# Patient Record
Sex: FEMALE | Race: WHITE | NOT HISPANIC OR LATINO | Employment: FULL TIME | URBAN - METROPOLITAN AREA
[De-identification: names, ages, dates, MRNs, and addresses within clinical notes are randomized per-mention and may not be internally consistent; named-entity substitution may affect disease eponyms.]

---

## 2024-09-05 ENCOUNTER — OFFICE VISIT (OUTPATIENT)
Age: 34
End: 2024-09-05
Payer: COMMERCIAL

## 2024-09-05 ENCOUNTER — LAB (OUTPATIENT)
Age: 34
End: 2024-09-05
Payer: COMMERCIAL

## 2024-09-05 VITALS
BODY MASS INDEX: 46.01 KG/M2 | WEIGHT: 250 LBS | SYSTOLIC BLOOD PRESSURE: 116 MMHG | HEART RATE: 68 BPM | OXYGEN SATURATION: 99 % | DIASTOLIC BLOOD PRESSURE: 68 MMHG | HEIGHT: 62 IN

## 2024-09-05 DIAGNOSIS — F99 INSOMNIA DUE TO OTHER MENTAL DISORDER: ICD-10-CM

## 2024-09-05 DIAGNOSIS — F41.0 GENERALIZED ANXIETY DISORDER WITH PANIC ATTACKS: ICD-10-CM

## 2024-09-05 DIAGNOSIS — F33.1 MODERATE EPISODE OF RECURRENT MAJOR DEPRESSIVE DISORDER: ICD-10-CM

## 2024-09-05 DIAGNOSIS — F51.05 INSOMNIA DUE TO OTHER MENTAL DISORDER: ICD-10-CM

## 2024-09-05 DIAGNOSIS — R63.0 ANOREXIA: ICD-10-CM

## 2024-09-05 DIAGNOSIS — R73.9 HYPERGLYCEMIA: ICD-10-CM

## 2024-09-05 DIAGNOSIS — E55.9 VITAMIN D DEFICIENCY DISEASE: Primary | ICD-10-CM

## 2024-09-05 DIAGNOSIS — F41.1 GENERALIZED ANXIETY DISORDER WITH PANIC ATTACKS: ICD-10-CM

## 2024-09-05 DIAGNOSIS — R94.31 ABNORMAL EKG: ICD-10-CM

## 2024-09-05 DIAGNOSIS — R63.0 ANOREXIA: Primary | ICD-10-CM

## 2024-09-05 DIAGNOSIS — F17.200 NICOTINE DEPENDENCE DUE TO VAPING NON-TOBACCO PRODUCT: ICD-10-CM

## 2024-09-05 PROBLEM — R12 HEART BURN: Status: ACTIVE | Noted: 2020-08-03

## 2024-09-05 PROBLEM — K92.0 HEMATEMESIS: Status: ACTIVE | Noted: 2019-11-04

## 2024-09-05 PROBLEM — F31.30 BIPOLAR DISORDER CURRENT EPISODE DEPRESSED: Status: ACTIVE | Noted: 2019-09-05

## 2024-09-05 PROBLEM — F50.22 BULIMIA NERVOSA, MODERATE: Status: ACTIVE | Noted: 2019-02-01

## 2024-09-05 PROBLEM — F50.2 BULIMIA NERVOSA, MODERATE: Status: ACTIVE | Noted: 2019-02-01

## 2024-09-05 PROBLEM — F12.20 CANNABIS DEPENDENCE: Status: ACTIVE | Noted: 2020-08-28

## 2024-09-05 PROBLEM — R13.10 DYSPHAGIA: Status: ACTIVE | Noted: 2019-09-05

## 2024-09-05 LAB
25(OH)D3 SERPL-MCNC: 39.7 NG/ML (ref 30–100)
ALBUMIN SERPL-MCNC: 4.2 G/DL (ref 3.5–5.2)
ALBUMIN/GLOB SERPL: 1.2 G/DL
ALP SERPL-CCNC: 108 U/L (ref 39–117)
ALT SERPL W P-5'-P-CCNC: 23 U/L (ref 1–33)
AMYLASE SERPL-CCNC: 46 U/L (ref 28–100)
ANION GAP SERPL CALCULATED.3IONS-SCNC: 9.8 MMOL/L (ref 5–15)
AST SERPL-CCNC: 22 U/L (ref 1–32)
BASOPHILS # BLD AUTO: 0.04 10*3/MM3 (ref 0–0.2)
BASOPHILS NFR BLD AUTO: 0.4 % (ref 0–1.5)
BILIRUB SERPL-MCNC: 0.4 MG/DL (ref 0–1.2)
BUN SERPL-MCNC: 13 MG/DL (ref 6–20)
BUN/CREAT SERPL: 14.1 (ref 7–25)
CALCIUM SPEC-SCNC: 9.8 MG/DL (ref 8.6–10.5)
CHLORIDE SERPL-SCNC: 100 MMOL/L (ref 98–107)
CHOLEST SERPL-MCNC: 278 MG/DL (ref 0–200)
CO2 SERPL-SCNC: 26.2 MMOL/L (ref 22–29)
CREAT SERPL-MCNC: 0.92 MG/DL (ref 0.57–1)
DEPRECATED RDW RBC AUTO: 40.6 FL (ref 37–54)
EGFRCR SERPLBLD CKD-EPI 2021: 85 ML/MIN/1.73
EOSINOPHIL # BLD AUTO: 0.12 10*3/MM3 (ref 0–0.4)
EOSINOPHIL NFR BLD AUTO: 1.3 % (ref 0.3–6.2)
ERYTHROCYTE [DISTWIDTH] IN BLOOD BY AUTOMATED COUNT: 13.1 % (ref 12.3–15.4)
FERRITIN SERPL-MCNC: 68.8 NG/ML (ref 13–150)
GLOBULIN UR ELPH-MCNC: 3.4 GM/DL
GLUCOSE SERPL-MCNC: 108 MG/DL (ref 65–99)
HBA1C MFR BLD: 5.6 % (ref 4.8–5.6)
HCT VFR BLD AUTO: 40.4 % (ref 34–46.6)
HDLC SERPL-MCNC: 33 MG/DL (ref 40–60)
HGB BLD-MCNC: 13.5 G/DL (ref 12–15.9)
IMM GRANULOCYTES # BLD AUTO: 0.03 10*3/MM3 (ref 0–0.05)
IMM GRANULOCYTES NFR BLD AUTO: 0.3 % (ref 0–0.5)
IRON 24H UR-MRATE: 103 MCG/DL (ref 37–145)
IRON SATN MFR SERPL: 30 % (ref 20–50)
LDLC SERPL CALC-MCNC: 189 MG/DL (ref 0–100)
LDLC/HDLC SERPL: 5.7 {RATIO}
LIPASE SERPL-CCNC: 27 U/L (ref 13–60)
LYMPHOCYTES # BLD AUTO: 2.47 10*3/MM3 (ref 0.7–3.1)
LYMPHOCYTES NFR BLD AUTO: 25.9 % (ref 19.6–45.3)
MAGNESIUM SERPL-MCNC: 2.2 MG/DL (ref 1.6–2.6)
MCH RBC QN AUTO: 28.9 PG (ref 26.6–33)
MCHC RBC AUTO-ENTMCNC: 33.4 G/DL (ref 31.5–35.7)
MCV RBC AUTO: 86.5 FL (ref 79–97)
MONOCYTES # BLD AUTO: 0.52 10*3/MM3 (ref 0.1–0.9)
MONOCYTES NFR BLD AUTO: 5.5 % (ref 5–12)
NEUTROPHILS NFR BLD AUTO: 6.35 10*3/MM3 (ref 1.7–7)
NEUTROPHILS NFR BLD AUTO: 66.6 % (ref 42.7–76)
NRBC BLD AUTO-RTO: 0 /100 WBC (ref 0–0.2)
PHOSPHATE SERPL-MCNC: 2.5 MG/DL (ref 2.5–4.5)
PLATELET # BLD AUTO: 356 10*3/MM3 (ref 140–450)
PMV BLD AUTO: 9.1 FL (ref 6–12)
POTASSIUM SERPL-SCNC: 4.5 MMOL/L (ref 3.5–5.2)
PROT SERPL-MCNC: 7.6 G/DL (ref 6–8.5)
RBC # BLD AUTO: 4.67 10*6/MM3 (ref 3.77–5.28)
SODIUM SERPL-SCNC: 136 MMOL/L (ref 136–145)
T4 FREE SERPL-MCNC: 1.09 NG/DL (ref 0.92–1.68)
TIBC SERPL-MCNC: 347 MCG/DL (ref 298–536)
TRANSFERRIN SERPL-MCNC: 233 MG/DL (ref 200–360)
TRIGL SERPL-MCNC: 284 MG/DL (ref 0–150)
TSH SERPL DL<=0.05 MIU/L-ACNC: 0.96 UIU/ML (ref 0.27–4.2)
VIT B12 BLD-MCNC: 570 PG/ML (ref 211–946)
VLDLC SERPL-MCNC: 56 MG/DL (ref 5–40)
WBC NRBC COR # BLD AUTO: 9.53 10*3/MM3 (ref 3.4–10.8)

## 2024-09-05 PROCEDURE — 84439 ASSAY OF FREE THYROXINE: CPT | Performed by: INTERNAL MEDICINE

## 2024-09-05 PROCEDURE — 83735 ASSAY OF MAGNESIUM: CPT | Performed by: INTERNAL MEDICINE

## 2024-09-05 PROCEDURE — 82306 VITAMIN D 25 HYDROXY: CPT | Performed by: INTERNAL MEDICINE

## 2024-09-05 PROCEDURE — 82728 ASSAY OF FERRITIN: CPT | Performed by: INTERNAL MEDICINE

## 2024-09-05 PROCEDURE — 83540 ASSAY OF IRON: CPT | Performed by: INTERNAL MEDICINE

## 2024-09-05 PROCEDURE — 83690 ASSAY OF LIPASE: CPT | Performed by: INTERNAL MEDICINE

## 2024-09-05 PROCEDURE — 84100 ASSAY OF PHOSPHORUS: CPT | Performed by: INTERNAL MEDICINE

## 2024-09-05 PROCEDURE — 80050 GENERAL HEALTH PANEL: CPT | Performed by: INTERNAL MEDICINE

## 2024-09-05 PROCEDURE — 82150 ASSAY OF AMYLASE: CPT | Performed by: INTERNAL MEDICINE

## 2024-09-05 PROCEDURE — 82607 VITAMIN B-12: CPT | Performed by: INTERNAL MEDICINE

## 2024-09-05 PROCEDURE — 80061 LIPID PANEL: CPT | Performed by: INTERNAL MEDICINE

## 2024-09-05 PROCEDURE — 36415 COLL VENOUS BLD VENIPUNCTURE: CPT | Performed by: INTERNAL MEDICINE

## 2024-09-05 PROCEDURE — 83036 HEMOGLOBIN GLYCOSYLATED A1C: CPT | Performed by: INTERNAL MEDICINE

## 2024-09-05 PROCEDURE — 99204 OFFICE O/P NEW MOD 45 MIN: CPT | Performed by: INTERNAL MEDICINE

## 2024-09-05 PROCEDURE — 84466 ASSAY OF TRANSFERRIN: CPT | Performed by: INTERNAL MEDICINE

## 2024-09-05 RX ORDER — HYDROXYZINE PAMOATE 25 MG/1
25 CAPSULE ORAL 3 TIMES DAILY PRN
COMMUNITY
Start: 2024-07-18

## 2024-09-05 RX ORDER — PROPRANOLOL HCL 60 MG
60 CAPSULE, EXTENDED RELEASE 24HR ORAL DAILY
COMMUNITY
Start: 2024-07-17

## 2024-09-05 RX ORDER — FLUOXETINE 40 MG/1
80 CAPSULE ORAL DAILY
COMMUNITY
Start: 2024-07-17 | End: 2024-10-29

## 2024-09-05 RX ORDER — ONDANSETRON 4 MG/1
4 TABLET, ORALLY DISINTEGRATING ORAL EVERY 8 HOURS PRN
COMMUNITY
Start: 2022-06-29 | End: 2024-09-06 | Stop reason: SDUPTHER

## 2024-09-05 RX ORDER — CLONIDINE HYDROCHLORIDE 0.2 MG/1
0.2 TABLET ORAL
COMMUNITY
Start: 2022-07-29

## 2024-09-05 NOTE — PROGRESS NOTES
New Patient Note    Adriana Bauman is a 34 y.o. female.    Chief Complaint   Patient presents with    [facility]    Eating Disorder       HPI    Anorexia  - went to [facility] last year, there for 8 months  - Marni since 2020 [facility], weekly  - Lucero Larson for counseling [id number], weekly  - no purging issues  - now having more restriction issues  - Nutrition - meal plan  - Lucero working on EMDR and Eating DO  - no leg cramps or palpitations or presyncope/syncope  - have had electrolyte issues with Mg and Phos, K  - Lunch - no  - Super - no  - Breakfast - energy drink  - yesterday had PB crackers, Banana  - supplements: 2 protein shakes per day  - exercise: 4-5 times weekly, gym or weights a home    Previously seen by Dionne Holly at , [facility]    Insomnia  - mainly for sleep - Clonidine and pared with Vistaril    Depression  - had at one time dx with BAD  - off Lamotrigine for 1yr, took around 5yrs, no differnce  - Fluoxetine has done well    Anxiety  - Propranolol used daily        Past Medical History:  Patient Active Problem List   Diagnosis    Bipolar disorder current episode depressed    Bulimia nervosa, moderate    Cannabis dependence    Dysphagia    Generalized anxiety disorder with panic attacks    Heart burn    Hematemesis    Insomnia due to other mental disorder        Medications:    Current Outpatient Medications:     cloNIDine (CATAPRES) 0.2 MG tablet, Take 1 tablet by mouth., Disp: , Rfl:     FLUoxetine (PROzac) 40 MG capsule, Take 2 capsules by mouth Daily., Disp: , Rfl:     hydrOXYzine pamoate (VISTARIL) 25 MG capsule, Take 1 capsule by mouth 3 (Three) Times a Day As Needed. 50 mg at night, Disp: , Rfl:     ondansetron ODT (ZOFRAN-ODT) 4 MG disintegrating tablet, Place 1 tablet on the tongue Every 8 (Eight) Hours As Needed for Nausea., Disp: 15 tablet, Rfl: 1    propranolol LA (INDERAL LA) 60 MG 24 hr capsule, Take 1 capsule by mouth  Daily., Disp: , Rfl:      Allergy to Medications:  Allergies   Allergen Reactions    Hpv 4-Valent Vaccine Recombinant Vaccine Rash, Swelling and Unknown (See Comments)        Immunizations:  Immunization History   Administered Date(s) Administered    COVID-19 (MODERNA) 1st,2nd,3rd Dose Monovalent 2021, 2021    COVID-19 (MODERNA) Monovalent Original Booster 2021    COVID-19 (PFIZER) BIVALENT 12+YRS 10/17/2022    Fluzone (or Fluarix & Flulaval for VFC) >6mos 10/05/2021, 10/17/2022, 2023    Hepatitis B Adult/Adolescent IM 2003, 2003, 2004    Influenza, Unspecified 2016, 10/23/2018, 10/23/2019    MMR 1993, 1997    Tdap 2017       GYN History:  LMP:last month  Menstrual Concerns: sometimes irregular  History of STD: chlamydia long time ago  Last Pap:not sure, probably due for one  or   History of Abnormal Pap:no  Pregnancy History: G:  P:no    Surgeries:  Past Surgical History:   Procedure Laterality Date    CHOLECYSTECTOMY  2018        Family History:  Family History   Problem Relation Age of Onset    Alcohol abuse Mother     Drug abuse Mother          if fentanyl overdose in     Alcohol abuse Father     Drug abuse Father          of opiate overdose in     Heart disease Maternal Grandmother     Heart attack Maternal Grandmother     Alcohol abuse Maternal Grandfather     Alcohol abuse Paternal Grandfather     Colon cancer Neg Hx     Breast cancer Neg Hx     Stroke Neg Hx         Social:  Social History     Socioeconomic History    Marital status: Single   Tobacco Use    Smoking status: Former     Current packs/day: 0.00     Types: Cigarettes     Quit date: 2018     Years since quittin.6    Smokeless tobacco: Never   Vaping Use    Vaping status: Every Day    Substances: Nicotine    Devices: Refillable tank   Substance and Sexual Activity    Alcohol use: Not Currently    Drug use: Not Currently     Types: Marijuana    Sexual  "activity: Not Currently     Birth control/protection: Abstinence       Household:  Occupation:[employer]  Hobbies:  Exercise:    No longer using THC      Objective   /68   Pulse 68   Ht 157.5 cm (62\")   Wt 113 kg (250 lb)   SpO2 99%   BMI 45.73 kg/m²   Class 3 Severe Obesity (BMI >=40). Obesity-related health conditions include the following: none. Obesity is unchanged. BMI is is above average; BMI management plan is completed. We discussed portion control, increasing exercise, and Information on healthy weight added to patient's after visit summary.       Physical Exam  Vitals reviewed.   Constitutional:       General: She is not in acute distress.  HENT:      Nose: Nose normal.      Mouth/Throat:      Mouth: Mucous membranes are moist.   Eyes:      Conjunctiva/sclera: Conjunctivae normal.   Neck:      Comments: No thyroid enlargement or nodularity  Cardiovascular:      Rate and Rhythm: Normal rate and regular rhythm.      Heart sounds: Normal heart sounds. No murmur heard.  Pulmonary:      Effort: Pulmonary effort is normal. No respiratory distress.      Breath sounds: Normal breath sounds.   Abdominal:      General: Abdomen is flat. Bowel sounds are normal. There is no distension.      Palpations: Abdomen is soft.      Tenderness: There is no abdominal tenderness.   Skin:     General: Skin is warm and dry.   Neurological:      Mental Status: She is alert.   Psychiatric:         Mood and Affect: Mood normal.         Thought Content: Thought content normal.         Judgment: Judgment normal.       ECG 12 Lead    Date/Time: 9/7/2024 8:47 PM  Performed by: Alexandra Irwin MD    Authorized by: Alexandra Irwin MD  Comparison: compared with previous ECG   Comparison to previous ECG: [facility] 2013: TWI V1-V2  [facility] 2022: TWI V1-V3, TWF V4  Rhythm: sinus rhythm  Rate: normal  Conduction: conduction normal  ST Segments: ST segments normal  T inversion: V1, V2, V3 and V4  QRS axis: Leftward " axis.    Clinical impression: abnormal EKG  Comments: In review of [facility] EKGs, 2013 TWI V1-V2, 2022: TWI V1-3, today TWI V1-V4. May represent persistent juvenile pattern but will plan to refer to Cards for further assessment.         Assessment & Plan   1. Anorexia  - follows with Marni Hickman, weekly  - follows with Lucero Larson for counseling, weekly  - no purging  - mostly restriction issues  - no active palps, presyncope/syncope, leg cramps  - exercises 4-5 times weekly  - 2 protein shakes per day  - CBC & Differential; Future  - Ferritin; Future  - Amylase; Future  - Lipase; Future  - Comprehensive Metabolic Panel; Future  - Vitamin D,25-Hydroxy; Future  - TSH+Free T4; Future  - Phosphorus; Future  - Magnesium; Future  - Vitamin B12; Future  - Iron Profile; Future  - Lipid Panel; Future    Abnormal EKG  - In review of [facility] EKGs, 2013 TWI V1-V2, 2022: TWI V1-3, today TWI V1-V4. May represent persistent juvenile pattern but will plan to refer to Cards for further assessment.   - no palps, CP, presyncope or syncope  - may represent persistent juvenile pattern but will refer to Cards for additional assessment in lieu of eating do history and progression of ant leads with TWI as older.     Insomnia  - continue Clonidine and Vistaril    Depression  - had at one time dx with BAD  - off Lamotrigine for 1yr, took around 5yrs, no differnce  - Fluoxetine has done well, continue  - following with Lucero Larson weekly    Anxiety  - Propranolol used daily, continue    Hyperglycemia  - Hemoglobin A1c; Future     Nicotine Dependence due to Vaping  Merna Howe  reports that she quit smoking about 6 years ago. Her smoking use included cigarettes. She has never used smokeless tobacco. I have educated her on the risk of diseases from using tobacco products such as cancer, COPD, and heart disease.     I advised her to quit and she is willing to quit in the future. We will explore further as other mental health is  improved.     FU in 1mo for annual    Health Care Maintenance:  HIV Screen:  Hep C Screen:    Colon Ca Screening: Start Screening at 44yo   Mammogram: Start Screening at 41yo   Pap: 2021, due 2024  Lung Cancer: Positive smoking history screen at 49yo    Immunizations:discuss next visit    Lipids:today  A1C:today    Alexandra Irwin MD, FAAP, FACP  Internal Medicine and Pediatrics  [facility]

## 2024-09-06 RX ORDER — ONDANSETRON 4 MG/1
4 TABLET, ORALLY DISINTEGRATING ORAL EVERY 8 HOURS PRN
Qty: 15 TABLET | Refills: 1 | Status: SHIPPED | OUTPATIENT
Start: 2024-09-06

## 2024-09-07 PROBLEM — F31.30 BIPOLAR DISORDER CURRENT EPISODE DEPRESSED: Status: RESOLVED | Noted: 2019-09-05 | Resolved: 2024-09-07

## 2024-09-07 PROBLEM — F33.1 MODERATE EPISODE OF RECURRENT MAJOR DEPRESSIVE DISORDER: Status: ACTIVE | Noted: 2024-09-07

## 2024-09-07 PROBLEM — R13.10 DYSPHAGIA: Status: RESOLVED | Noted: 2019-09-05 | Resolved: 2024-09-07

## 2024-09-07 PROBLEM — K92.0 HEMATEMESIS: Status: RESOLVED | Noted: 2019-11-04 | Resolved: 2024-09-07

## 2024-09-07 PROBLEM — F17.200 NICOTINE DEPENDENCE DUE TO VAPING NON-TOBACCO PRODUCT: Status: ACTIVE | Noted: 2024-09-07

## 2024-09-07 PROBLEM — R94.31 ABNORMAL EKG: Status: ACTIVE | Noted: 2024-09-07

## 2024-09-07 PROBLEM — R63.0 ANOREXIA: Status: ACTIVE | Noted: 2024-09-07

## 2024-09-07 PROCEDURE — 93000 ELECTROCARDIOGRAM COMPLETE: CPT | Performed by: INTERNAL MEDICINE

## 2024-10-04 ENCOUNTER — TELEPHONE (OUTPATIENT)
Age: 34
End: 2024-10-04
Payer: COMMERCIAL

## 2024-10-18 ENCOUNTER — OFFICE VISIT (OUTPATIENT)
Age: 34
End: 2024-10-18
Payer: COMMERCIAL

## 2024-10-18 VITALS
DIASTOLIC BLOOD PRESSURE: 76 MMHG | HEART RATE: 76 BPM | OXYGEN SATURATION: 98 % | HEIGHT: 62 IN | SYSTOLIC BLOOD PRESSURE: 122 MMHG | RESPIRATION RATE: 18 BRPM | BODY MASS INDEX: 45.73 KG/M2

## 2024-10-18 DIAGNOSIS — Z23 NEED FOR VACCINATION: ICD-10-CM

## 2024-10-18 DIAGNOSIS — Z00.00 WELLNESS EXAMINATION: Primary | ICD-10-CM

## 2024-10-18 PROCEDURE — 99395 PREV VISIT EST AGE 18-39: CPT | Performed by: INTERNAL MEDICINE

## 2024-10-18 PROCEDURE — 90471 IMMUNIZATION ADMIN: CPT | Performed by: INTERNAL MEDICINE

## 2024-10-18 PROCEDURE — 90656 IIV3 VACC NO PRSV 0.5 ML IM: CPT | Performed by: INTERNAL MEDICINE

## 2024-10-18 RX ORDER — CLONIDINE HYDROCHLORIDE 0.2 MG/1
0.2 TABLET ORAL
COMMUNITY
Start: 2024-09-11

## 2024-10-18 RX ORDER — HYDROXYZINE PAMOATE 50 MG/1
50 CAPSULE ORAL 3 TIMES DAILY PRN
COMMUNITY
Start: 2024-09-11

## 2024-10-18 NOTE — PROGRESS NOTES
Annual Health Maintenance Exam    Subjective      Merna Carley     RIMA     Ms. Howe is here today for their annual visit.     General Health:  Reported Health: Good    Social History:  Household Members: Roomate  Marital Status: Unmarried   Housing Situation: Stable  /  Unstable  Work Status: Employed Full Time   Occupation:[employer]  Hobbies/ Travel:    General Health:  Diet: consistency work with Marni right now  Caffeine:2-3 energy drinks, 1-2 cups of coffee  Calcium Intake:lactose intolerant  Weight Concerns:eating do  Supplements:no  Exercise:Marni has asked her to refrain bc have had history of over doing it before  Screen Time:2-3hrs, mostly reading on [employer]    Dental Exam:several years  Dental Concerns:no, does have dental insurance    Vision Exam:last year  Vision Concerns:wears contacts/glasses    Hearing Loss:no    Risky Behaviors:  Seatbelt Use:yes  Texting While Driving:no    Tobacco Use:vaping, counseled on use, but focused on working on eating do  If positive:     pack years    Alcohol Use:no  If positive, consider AUDITC    Drug Use:no  If positive, consider DAST    Reproductive Health:  LMP:just ended yesterday  Last Pap: Date:2022    Cytology / HPV Result:normal  History of Abnormal Pap:no  Due Date for Next Pap:2025  Sexually Active:  History of STD:  Contraception:   G: P:   Pregnancy History Details:    Menstrual Status:   Premenopausal:   - Menstrual Concerns: have had some irregularity in setting of eating do      Mood:  PHQ-9 Depression Screening  Little interest or pleasure in doing things?     Feeling down, depressed, or hopeless?     PHQ-2 Total Score     Trouble falling or staying asleep, or sleeping too much?     Feeling tired or having little energy?     Poor appetite or overeating?     Feeling bad about yourself - or that you are a failure or have let yourself or your family down?     Trouble concentrating on things, such as reading the newspaper or watching television?      Moving or speaking so slowly that other people could have noticed? Or the opposite - being so fidgety or restless that you have been moving around a lot more than usual?     Thoughts that you would be better off dead, or of hurting yourself in some way?     PHQ-9 Total Score     If you checked off any problems, how difficult have these problems made it for you to do your work, take care of things at home, or get along with other people?         PHQ-9 Total Score:      Anxiety Screening: GAD7 Score    Past Medical History:  Patient Active Problem List   Diagnosis    Cannabis dependence    Generalized anxiety disorder with panic attacks    Heart burn    Insomnia due to other mental disorder    Anorexia    Moderate episode of recurrent major depressive disorder    Nicotine dependence due to vaping non-tobacco product    Abnormal EKG        Allergies:  Allergies   Allergen Reactions    Human Papillomavirus 4-Valent Recombinant Vaccine Rash, Swelling and Unknown (See Comments)        Medications:    Current Outpatient Medications:     cloNIDine (CATAPRES) 0.2 MG tablet, Take 1 tablet by mouth., Disp: , Rfl:     cloNIDine (CATAPRES) 0.2 MG tablet, Take 1 tablet by mouth., Disp: , Rfl:     FLUoxetine (PROzac) 40 MG capsule, Take 2 capsules by mouth Daily., Disp: , Rfl:     hydrOXYzine pamoate (VISTARIL) 25 MG capsule, Take 1 capsule by mouth 3 (Three) Times a Day As Needed. 50 mg at night, Disp: , Rfl:     hydrOXYzine pamoate (VISTARIL) 50 MG capsule, Take 1 capsule by mouth 3 (Three) Times a Day As Needed., Disp: , Rfl:     ondansetron ODT (ZOFRAN-ODT) 4 MG disintegrating tablet, Place 1 tablet on the tongue Every 8 (Eight) Hours As Needed for Nausea., Disp: 15 tablet, Rfl: 1    propranolol LA (INDERAL LA) 60 MG 24 hr capsule, Take 1 capsule by mouth Daily., Disp: , Rfl:      Immunizations:  Immunization History   Administered Date(s) Administered    COVID-19 (MODERNA) 1st,2nd,3rd Dose Monovalent 01/09/2021, 02/22/2021     COVID-19 (MODERNA) Monovalent Original Booster 2021    COVID-19 (PFIZER) BIVALENT 12+YRS 10/17/2022    Fluzone (or Fluarix & Flulaval for VFC) >6mos 10/05/2021, 10/17/2022, 2023    Hepatitis B Adult/Adolescent IM 2003, 2003, 2004    Influenza, Unspecified 2016, 10/23/2018, 10/23/2019    MMR 1993, 1997    Tdap 2017        Surgical History:  Past Surgical History:   Procedure Laterality Date    CHOLECYSTECTOMY  2018        Family History:  Family History   Problem Relation Age of Onset    Alcohol abuse Mother     Drug abuse Mother          if fentanyl overdose in     Alcohol abuse Father     Drug abuse Father          of opiate overdose in     Heart disease Maternal Grandmother     Heart attack Maternal Grandmother     Alcohol abuse Maternal Grandfather     Alcohol abuse Paternal Grandfather     Colon cancer Neg Hx     Breast cancer Neg Hx     Stroke Neg Hx      Any change in family history since last annual visit:  no  Any family history of colon cancer, breast cancer, ovarian cancer, early CAD: see fam history      The following portions of the patient's chart were reviewed in this encounter and updated as appropriate: Past Medical History, Surgical History, Family History, and Social History         Objective      Vitals:    10/18/24 0948   BP: 122/76   Pulse: 76   Resp: 18   SpO2: 98%                Physical Exam  Vitals reviewed.   Constitutional:       General: She is not in acute distress.  HENT:      Nose: Nose normal.      Mouth/Throat:      Mouth: Mucous membranes are moist.   Eyes:      Conjunctiva/sclera: Conjunctivae normal.   Cardiovascular:      Rate and Rhythm: Normal rate and regular rhythm.      Heart sounds: Normal heart sounds. No murmur heard.  Pulmonary:      Effort: Pulmonary effort is normal. No respiratory distress.      Breath sounds: Normal breath sounds.   Abdominal:      General: Abdomen is flat. Bowel sounds are  normal. There is no distension.      Palpations: Abdomen is soft.      Tenderness: There is no abdominal tenderness.   Neurological:      Mental Status: She is alert.   Psychiatric:         Thought Content: Thought content normal.      Comments: flat     }     Assessment & Plan        Today was a preventative health visit: Patient was counseled on the following:  Lifestyle Changes: Weight Loss, Diet, Exercise  Calcium and Vitamin D Supplementation and Weight Bearing Exercise for Bone Health  Reproductive Health  Safety with driving  Work and Life Balance    Health Maintenance:    Infectious Disease Screening:  One Time HIV Screen: declined felt to be low risk  One Time Hepatitis C Screen: declined felt to be low risk      Vaccinations:  Tdap:  Hep A:  Hep B:  Shingles:  PPSV:  PCV:  COVID:  Flu: today    Cancer Screening:  Colonoscopy: start at age 46yo / no fam history  Mammogram: start at age 39yo / no fam history  Pap: Last date completed and Findings:8/11/2022   Next Due:8/11/2025  Lung Cancer Screening: smoker, not age brandon    CV Screening:  Lipids: Last date completed:  9/2024 Next Due:   A1C: Last date completed:9/2024   Next Due:     BP at goal < 130/80    Mood:   Insomnia  - continue Clonidine and Vistaril     Depression  - had at one time dx with BAD  - off Lamotrigine for 1yr, took around 5yrs, no differnce  - Fluoxetine has done well, continue  - following with Lucero Larson weekly     Anxiety  - Propranolol used daily, continue       Recommended:Dental Visit / Eye Exam    Bone Health: Recommended  appropriate vitamin D and Calcium intake    Patient planning to reschedule Cards appt    FU in 3mo, with labs        Alexandra Irwin MD, FAAP, FACP  Internal Medicine and Pediatrics  [facility]

## 2024-12-13 ENCOUNTER — OFFICE VISIT (OUTPATIENT)
Age: 34
End: 2024-12-13
Payer: COMMERCIAL

## 2024-12-13 VITALS
WEIGHT: 250 LBS | OXYGEN SATURATION: 99 % | HEIGHT: 62 IN | DIASTOLIC BLOOD PRESSURE: 68 MMHG | SYSTOLIC BLOOD PRESSURE: 108 MMHG | HEART RATE: 79 BPM | BODY MASS INDEX: 46.01 KG/M2

## 2024-12-13 DIAGNOSIS — J06.9 VIRAL URI: ICD-10-CM

## 2024-12-13 DIAGNOSIS — F51.05 INSOMNIA DUE TO OTHER MENTAL DISORDER: ICD-10-CM

## 2024-12-13 DIAGNOSIS — F33.1 MODERATE EPISODE OF RECURRENT MAJOR DEPRESSIVE DISORDER: ICD-10-CM

## 2024-12-13 DIAGNOSIS — F99 INSOMNIA DUE TO OTHER MENTAL DISORDER: ICD-10-CM

## 2024-12-13 DIAGNOSIS — F41.0 GENERALIZED ANXIETY DISORDER WITH PANIC ATTACKS: Primary | ICD-10-CM

## 2024-12-13 DIAGNOSIS — R63.0 ANOREXIA: ICD-10-CM

## 2024-12-13 DIAGNOSIS — F41.1 GENERALIZED ANXIETY DISORDER WITH PANIC ATTACKS: Primary | ICD-10-CM

## 2024-12-13 PROCEDURE — 99214 OFFICE O/P EST MOD 30 MIN: CPT | Performed by: INTERNAL MEDICINE

## 2024-12-13 NOTE — PROGRESS NOTES
Progress Note    Adriana Bauman is a 34 y.o. female.    Chief Complaint   Patient presents with    Hypotension       HPI  Sat Night got sick  UTC and COVID/Flu and Strep negative on Sun  Given steroids and finsihed last night  Nasal congestion, cough, ST  No v/d/rash  No fever  Starting to feel better  Some lingering cough and congestion    Eating easier now and drinking   UOP several times in the past 2 hours  No passing out or palps  No muscle cramps    [facility]  Using Clonidine for sleep at night  She has self held with some low diastolic BP while sick at 30-50     Past Medical History:  Patient Active Problem List   Diagnosis    Cannabis dependence    Generalized anxiety disorder with panic attacks    Heart burn    Insomnia due to other mental disorder    Anorexia    Moderate episode of recurrent major depressive disorder    Nicotine dependence due to vaping non-tobacco product    Abnormal EKG       Medications:  Current Outpatient Medications on File Prior to Visit   Medication Sig Dispense Refill    cloNIDine (CATAPRES) 0.2 MG tablet Take 1 tablet by mouth.      cloNIDine (CATAPRES) 0.2 MG tablet Take 1 tablet by mouth.      hydrOXYzine pamoate (VISTARIL) 25 MG capsule Take 1 capsule by mouth 3 (Three) Times a Day As Needed. 50 mg at night      hydrOXYzine pamoate (VISTARIL) 50 MG capsule Take 1 capsule by mouth 3 (Three) Times a Day As Needed.      ondansetron ODT (ZOFRAN-ODT) 4 MG disintegrating tablet Place 1 tablet on the tongue Every 8 (Eight) Hours As Needed for Nausea. 15 tablet 1    propranolol LA (INDERAL LA) 60 MG 24 hr capsule Take 1 capsule by mouth Daily.      FLUoxetine (PROzac) 40 MG capsule Take 2 capsules by mouth Daily.       No current facility-administered medications on file prior to visit.       Allergies:   Allergies   Allergen Reactions    Human Papillomavirus 4-Valent Recombinant Vaccine Rash, Swelling and Unknown (See Comments)       Immunizations:  Immunization  "History   Administered Date(s) Administered    COVID-19 (MODERNA) 1st,2nd,3rd Dose Monovalent 2021, 2021    COVID-19 (MODERNA) Monovalent Original Booster 2021    COVID-19 (PFIZER) BIVALENT 12+YRS 10/17/2022    Fluzone  >6mos 10/18/2024    Fluzone (or Fluarix & Flulaval for VFC) >6mos 10/05/2021, 10/17/2022, 2023    Hepatitis B Adult/Adolescent IM 2003, 2003, 2004    Influenza, Unspecified 2016, 10/23/2018, 10/23/2019    MMR 1993, 1997    Tdap 2017        Family History:  Family History   Problem Relation Age of Onset    Alcohol abuse Mother     Drug abuse Mother          of fentanyl overdose in     Alcohol abuse Father     Drug abuse Father          of opiate overdose in     Heart disease Maternal Grandmother     Heart attack Maternal Grandmother     Alcohol abuse Maternal Grandfather     Alcohol abuse Paternal Grandfather     Colon cancer Neg Hx     Breast cancer Neg Hx     Stroke Neg Hx        Social History:  Social History     Socioeconomic History    Marital status: Single   Tobacco Use    Smoking status: Former     Current packs/day: 0.00     Average packs/day: (0.1 ttl pk-yrs)     Types: Cigarettes     Start date:      Quit date: 2018     Years since quittin.9    Smokeless tobacco: Never   Vaping Use    Vaping status: Every Day    Substances: Nicotine    Devices: Refillable tank   Substance and Sexual Activity    Alcohol use: Not Currently    Drug use: Not Currently     Types: Marijuana    Sexual activity: Not Currently     Birth control/protection: Abstinence       Objective   /68   Pulse 79   Ht 157.5 cm (62.01\")   Wt 113 kg (250 lb)   SpO2 99%   BMI 45.71 kg/m²             Physical Exam  Vitals reviewed.   Constitutional:       General: She is not in acute distress.  HENT:      Nose: Nose normal.      Mouth/Throat:      Mouth: Mucous membranes are moist.   Eyes:      Conjunctiva/sclera: Conjunctivae " normal.   Cardiovascular:      Rate and Rhythm: Normal rate and regular rhythm.      Heart sounds: Normal heart sounds. No murmur heard.  Pulmonary:      Effort: Pulmonary effort is normal. No respiratory distress.      Breath sounds: Normal breath sounds.   Abdominal:      General: Abdomen is flat. Bowel sounds are normal. There is no distension.      Palpations: Abdomen is soft.      Tenderness: There is no abdominal tenderness.   Skin:     General: Skin is warm and dry.   Neurological:      Mental Status: She is alert.         Assessment & Plan     Viral URI  - continue supportive care  - feels that she is improving  - COVID, Flu and strep testing negative at [facility]    Insomnia  - using Clonidine and previously provided by  and now seeing [facility] and online group  - advised to discuss with them about tapering off Clonidine given marginal day pressures  - occasional diastolic at 30-50  - patient said that she would discuss with Psych    Anorexia and Bulimia  Hypotension  - she was not eating or drinking well while ill  - having some low diastolic numbers  - today BP on low normal side  - discussed hydration and nutrition goals  - seen by Nutrition this week  - will discuss alternative agent for sleep with Psych  - BP improving as virus concludes  - no CP, palps, or syncope    FU in Jan as scheduled.       Alexandra Irwin MD, FAAP, FACP  Internal Medicine and Pediatrics  [facility]

## 2025-01-20 ENCOUNTER — OFFICE VISIT (OUTPATIENT)
Age: 35
End: 2025-01-20
Payer: COMMERCIAL

## 2025-01-20 ENCOUNTER — LAB (OUTPATIENT)
Age: 35
End: 2025-01-20
Payer: COMMERCIAL

## 2025-01-20 VITALS
WEIGHT: 211 LBS | OXYGEN SATURATION: 98 % | BODY MASS INDEX: 41.43 KG/M2 | HEART RATE: 61 BPM | DIASTOLIC BLOOD PRESSURE: 80 MMHG | HEIGHT: 60 IN | SYSTOLIC BLOOD PRESSURE: 122 MMHG

## 2025-01-20 DIAGNOSIS — R63.0 ANOREXIA: ICD-10-CM

## 2025-01-20 DIAGNOSIS — E78.5 HYPERLIPIDEMIA, UNSPECIFIED HYPERLIPIDEMIA TYPE: Primary | ICD-10-CM

## 2025-01-20 DIAGNOSIS — F51.05 INSOMNIA DUE TO OTHER MENTAL DISORDER: ICD-10-CM

## 2025-01-20 DIAGNOSIS — F41.0 GENERALIZED ANXIETY DISORDER WITH PANIC ATTACKS: ICD-10-CM

## 2025-01-20 DIAGNOSIS — F99 INSOMNIA DUE TO OTHER MENTAL DISORDER: ICD-10-CM

## 2025-01-20 DIAGNOSIS — E78.5 HYPERLIPIDEMIA, UNSPECIFIED HYPERLIPIDEMIA TYPE: ICD-10-CM

## 2025-01-20 DIAGNOSIS — R94.31 ABNORMAL EKG: ICD-10-CM

## 2025-01-20 DIAGNOSIS — F41.1 GENERALIZED ANXIETY DISORDER WITH PANIC ATTACKS: ICD-10-CM

## 2025-01-20 LAB
CHOLEST SERPL-MCNC: 261 MG/DL (ref 0–200)
HDLC SERPL-MCNC: 44 MG/DL (ref 40–60)
LDLC SERPL CALC-MCNC: 185 MG/DL (ref 0–100)
LDLC/HDLC SERPL: 4.15 {RATIO}
MAGNESIUM SERPL-MCNC: 2 MG/DL (ref 1.6–2.6)
PHOSPHATE SERPL-MCNC: 3.7 MG/DL (ref 2.5–4.5)
TRIGL SERPL-MCNC: 171 MG/DL (ref 0–150)
VLDLC SERPL-MCNC: 32 MG/DL (ref 5–40)

## 2025-01-20 PROCEDURE — 99214 OFFICE O/P EST MOD 30 MIN: CPT | Performed by: INTERNAL MEDICINE

## 2025-01-20 PROCEDURE — 83735 ASSAY OF MAGNESIUM: CPT | Performed by: INTERNAL MEDICINE

## 2025-01-20 PROCEDURE — 80061 LIPID PANEL: CPT | Performed by: INTERNAL MEDICINE

## 2025-01-20 PROCEDURE — 36415 COLL VENOUS BLD VENIPUNCTURE: CPT | Performed by: INTERNAL MEDICINE

## 2025-01-20 PROCEDURE — 80053 COMPREHEN METABOLIC PANEL: CPT | Performed by: INTERNAL MEDICINE

## 2025-01-20 PROCEDURE — 84100 ASSAY OF PHOSPHORUS: CPT | Performed by: INTERNAL MEDICINE

## 2025-01-20 RX ORDER — BUPROPION HYDROCHLORIDE 150 MG/1
150 TABLET ORAL EVERY MORNING
COMMUNITY
Start: 2025-01-08

## 2025-01-20 NOTE — PROGRESS NOTES
Progress Note    Subjective      Merna is a 34 y.o. female.    Chief Complaint   Patient presents with    Anxiety       HPI  Anorexia  - went to [facility] last year, there for 8 months  - Marni since 2020 [facility], weekly  - Lucero Larson for counseling [id number], weekly  - no purging issues  - now having more restriction issues  - Nutrition - meal plan  - Lucero working on EMDR and Eating DO, this is causing some flare in eating do  - no leg cramps or palpitations or presyncope/syncope  - have had electrolyte issues with Mg and Phos, K  - no exercise right now  - only dong one shake  - weigh ins with Marni     Previously seen by Dionne Holly at , formerly [facility]     Insomnia  - mainly for sleep - Clonidine      Depression  - had at one time dx with BAD  - off Lamotrigine for 1yr, took around 5yrs, no differnce  - Fluoxetine has done well     Anxiety  - Propranolol used daily    Past Medical History:  Patient Active Problem List   Diagnosis    Cannabis dependence    Generalized anxiety disorder with panic attacks    Heart burn    Insomnia due to other mental disorder    Anorexia    Moderate episode of recurrent major depressive disorder    Nicotine dependence due to vaping non-tobacco product    Abnormal EKG       Medications:  Current Outpatient Medications on File Prior to Visit   Medication Sig Dispense Refill    buPROPion XL (WELLBUTRIN XL) 150 MG 24 hr tablet Take 1 tablet by mouth Every Morning.      cloNIDine (CATAPRES) 0.2 MG tablet Take 1 tablet by mouth.      cloNIDine (CATAPRES) 0.2 MG tablet Take 1 tablet by mouth.      hydrOXYzine pamoate (VISTARIL) 25 MG capsule Take 1 capsule by mouth 3 (Three) Times a Day As Needed. 50 mg at night      hydrOXYzine pamoate (VISTARIL) 50 MG capsule Take 1 capsule by mouth 3 (Three) Times a Day As Needed.      ondansetron ODT (ZOFRAN-ODT) 4 MG disintegrating tablet Place 1 tablet on the tongue Every 8 (Eight) Hours As Needed for  Nausea. 15 tablet 1    propranolol LA (INDERAL LA) 60 MG 24 hr capsule Take 1 capsule by mouth Daily.      FLUoxetine (PROzac) 40 MG capsule Take 2 capsules by mouth Daily.       No current facility-administered medications on file prior to visit.       Allergies:   Allergies   Allergen Reactions    Human Papillomavirus 4-Valent Recombinant Vaccine Rash, Swelling and Unknown (See Comments)       Immunizations:  Immunization History   Administered Date(s) Administered    COVID-19 (MODERNA) 1st,2nd,3rd Dose Monovalent 2021, 2021    COVID-19 (MODERNA) Monovalent Original Booster 2021    COVID-19 (PFIZER) BIVALENT 12+YRS 10/17/2022    Fluzone  >6mos 10/18/2024    Fluzone (or Fluarix & Flulaval for VFC) >6mos 10/05/2021, 10/17/2022, 2023    Hepatitis B Adult/Adolescent IM 2003, 2003, 2004    Influenza, Unspecified 2016, 10/23/2018, 10/23/2019    MMR 1993, 1997    Tdap 2017        Family History:  Family History   Problem Relation Age of Onset    Alcohol abuse Mother     Drug abuse Mother          of fentanyl overdose in     Alcohol abuse Father     Drug abuse Father          of opiate overdose in     Heart disease Maternal Grandmother     Heart attack Maternal Grandmother     Alcohol abuse Maternal Grandfather     Alcohol abuse Paternal Grandfather     Colon cancer Neg Hx     Breast cancer Neg Hx     Stroke Neg Hx        Social History:  Social History     Socioeconomic History    Marital status: Single   Tobacco Use    Smoking status: Former     Current packs/day: 0.00     Average packs/day: (0.1 ttl pk-yrs)     Types: Cigarettes     Start date: 2015     Quit date: 2018     Years since quittin.0    Smokeless tobacco: Never   Vaping Use    Vaping status: Every Day    Substances: Nicotine    Devices: Refillable tank   Substance and Sexual Activity    Alcohol use: Not Currently    Drug use: Not Currently     Types: Marijuana    Sexual  "activity: Not Currently     Birth control/protection: Abstinence       Objective   /80   Pulse 61   Ht 157.5 cm (62.01\")   Wt 95.7 kg (211 lb)   SpO2 98%   BMI 38.58 kg/m²             Physical Exam  Vitals reviewed.   Constitutional:       General: She is not in acute distress.  HENT:      Nose: Nose normal.      Mouth/Throat:      Mouth: Mucous membranes are moist.   Eyes:      Conjunctiva/sclera: Conjunctivae normal.   Cardiovascular:      Rate and Rhythm: Normal rate and regular rhythm.      Heart sounds: Normal heart sounds. No murmur heard.  Pulmonary:      Effort: Pulmonary effort is normal. No respiratory distress.      Breath sounds: Normal breath sounds.   Neurological:      Mental Status: She is alert.   Psychiatric:      Comments: flat         Assessment & Plan       1. Anorexia  - follows with Marni Hickman, weekly, working on consistency  - follows with Lucero Larson for counseling, weekly, EMDR  - no purging  - mostly restriction issues  - no active palps, presyncope/syncope, leg cramps  - no exercise  - 2 protein shakes per day in the past and now doing 1 in am on work days  - CMP, lipids, Mg, Phos today     Abnormal EKG  - In review of [facility] EKGs, 2013 TWI V1-V2, 2022: TWI V1-3, today TWI V1-V4. May represent persistent juvenile pattern but will plan to refer to Cards for further assessment.   - no palps, CP, presyncope or syncope  - may represent persistent juvenile pattern but will refer to Cards for additional assessment in lieu of eating do history and progression of ant leads with TWI as older.   - has not seen cards but still on her to do list     Insomnia  - continue Clonidine no longer using Hydroxyzine for sleep     Depression  - had at one time dx with BAD  - off Lamotrigine for 1yr, took around 5yrs, no differnce  - Fluoxetine has done well, continue  - following with Lucero Larson weekly  ---doing EMDR     Anxiety  - Propranolol used daily, continue   "   Hyperglycemia  - Hemoglobin A1c 2024 normal     Nicotine Dependence due to Vaping  - she has cut back some     FU in 3mo       Alexandra Irwin MD, FAAP, FACP  Internal Medicine and Pediatrics  [facility]

## 2025-01-21 LAB
ALBUMIN SERPL-MCNC: 3.9 G/DL (ref 3.5–5.2)
ALBUMIN/GLOB SERPL: 1 G/DL
ALP SERPL-CCNC: 99 U/L (ref 39–117)
ALT SERPL W P-5'-P-CCNC: 18 U/L (ref 1–33)
ANION GAP SERPL CALCULATED.3IONS-SCNC: 12.7 MMOL/L (ref 5–15)
AST SERPL-CCNC: 19 U/L (ref 1–32)
BILIRUB SERPL-MCNC: 0.2 MG/DL (ref 0–1.2)
BUN SERPL-MCNC: 18 MG/DL (ref 6–20)
BUN/CREAT SERPL: 17.8 (ref 7–25)
CALCIUM SPEC-SCNC: 9.4 MG/DL (ref 8.6–10.5)
CHLORIDE SERPL-SCNC: 100 MMOL/L (ref 98–107)
CO2 SERPL-SCNC: 24.3 MMOL/L (ref 22–29)
CREAT SERPL-MCNC: 1.01 MG/DL (ref 0.57–1)
EGFRCR SERPLBLD CKD-EPI 2021: 76 ML/MIN/1.73
GLOBULIN UR ELPH-MCNC: 3.9 GM/DL
GLUCOSE SERPL-MCNC: 92 MG/DL (ref 65–99)
POTASSIUM SERPL-SCNC: 4.9 MMOL/L (ref 3.5–5.2)
PROT SERPL-MCNC: 7.8 G/DL (ref 6–8.5)
SODIUM SERPL-SCNC: 137 MMOL/L (ref 136–145)

## 2025-04-21 ENCOUNTER — OFFICE VISIT (OUTPATIENT)
Age: 35
End: 2025-04-21
Payer: COMMERCIAL

## 2025-04-21 VITALS
RESPIRATION RATE: 20 BRPM | OXYGEN SATURATION: 97 % | SYSTOLIC BLOOD PRESSURE: 118 MMHG | HEART RATE: 72 BPM | BODY MASS INDEX: 43 KG/M2 | WEIGHT: 219 LBS | DIASTOLIC BLOOD PRESSURE: 52 MMHG | HEIGHT: 60 IN

## 2025-04-21 DIAGNOSIS — R94.31 ABNORMAL EKG: ICD-10-CM

## 2025-04-21 DIAGNOSIS — R63.0 ANOREXIA: Primary | ICD-10-CM

## 2025-04-21 DIAGNOSIS — F33.1 MODERATE EPISODE OF RECURRENT MAJOR DEPRESSIVE DISORDER: ICD-10-CM

## 2025-04-21 DIAGNOSIS — F41.1 GENERALIZED ANXIETY DISORDER WITH PANIC ATTACKS: ICD-10-CM

## 2025-04-21 DIAGNOSIS — F41.0 GENERALIZED ANXIETY DISORDER WITH PANIC ATTACKS: ICD-10-CM

## 2025-04-21 PROCEDURE — 99214 OFFICE O/P EST MOD 30 MIN: CPT | Performed by: INTERNAL MEDICINE

## 2025-04-21 RX ORDER — NORGESTIMATE AND ETHINYL ESTRADIOL 0.25-0.035
1 KIT ORAL DAILY
COMMUNITY
Start: 2025-04-03

## 2025-04-21 RX ORDER — BUPROPION HYDROCHLORIDE 300 MG/1
300 TABLET ORAL EVERY MORNING
COMMUNITY
Start: 2025-02-06

## 2025-04-21 NOTE — PROGRESS NOTES
Progress Note    Subjective      Merna is a 34 y.o. female.    Chief Complaint   Patient presents with    Weight Check       HPI  Anorexia  - went to [facility] 2023, there for 8 months  - Marni since 2020 [facility], weekly  - Lucero Larson for counseling [id number], weekly - currently not seen for 1month bc therapist looking for office space  - no purging issues  - improved restriction issues  - Nutrition - meal plan  - Lucero working on EMDR and Eating DO, this is causing some flare in eating do  - no leg cramps or palpitations or presyncope/syncope  - have had electrolyte issues with Mg and Phos, K  - ok'd to exercise  - no shakes  - weigh ins with Marni     Previously seen by Dionne Holly at , formerly [facility]     Insomnia  - mainly for sleep - Clonidine      Depression  - had at one time dx with BAD  - off Lamotrigine for 1yr, took around 5yrs, no differnce  - Fluoxetine has done well     Anxiety  - Propranolol used daily  Past Medical History:  Patient Active Problem List   Diagnosis    Cannabis dependence    Generalized anxiety disorder with panic attacks    Heart burn    Insomnia due to other mental disorder    Anorexia    Moderate episode of recurrent major depressive disorder    Nicotine dependence due to vaping non-tobacco product    Abnormal EKG       Medications:  Current Outpatient Medications on File Prior to Visit   Medication Sig Dispense Refill    buPROPion XL (WELLBUTRIN XL) 300 MG 24 hr tablet Take 1 tablet by mouth Every Morning.      cloNIDine (CATAPRES) 0.2 MG tablet Take 1 tablet by mouth.      cloNIDine (CATAPRES) 0.2 MG tablet Take 1 tablet by mouth.      hydrOXYzine pamoate (VISTARIL) 50 MG capsule Take 1 capsule by mouth 3 (Three) Times a Day As Needed.      norgestimate-ethinyl estradiol (ORTHO-CYCLEN) 0.25-35 MG-MCG per tablet Take 1 tablet by mouth Daily.      ondansetron ODT (ZOFRAN-ODT) 4 MG disintegrating tablet Place 1 tablet on the tongue Every  8 (Eight) Hours As Needed for Nausea. 15 tablet 1    propranolol LA (INDERAL LA) 60 MG 24 hr capsule Take 1 capsule by mouth Daily.      buPROPion XL (WELLBUTRIN XL) 150 MG 24 hr tablet Take 1 tablet by mouth Every Morning. (Patient not taking: Reported on 2025)      FLUoxetine (PROzac) 40 MG capsule Take 2 capsules by mouth Daily.      hydrOXYzine pamoate (VISTARIL) 25 MG capsule Take 1 capsule by mouth 3 (Three) Times a Day As Needed. 50 mg at night (Patient not taking: Reported on 2025)       No current facility-administered medications on file prior to visit.       Allergies:   Allergies   Allergen Reactions    Human Papillomavirus 4-Valent Recombinant Vaccine Rash, Swelling and Unknown (See Comments)       Immunizations:  Immunization History   Administered Date(s) Administered    COVID-19 (MODERNA) 1st,2nd,3rd Dose Monovalent 2021, 2021    COVID-19 (MODERNA) Monovalent Original Booster 2021    COVID-19 (PFIZER) BIVALENT 12+YRS 10/17/2022    Fluzone  >6mos 10/18/2024    Fluzone (or Fluarix & Flulaval for VFC) >6mos 10/05/2021, 10/17/2022, 2023    Hepatitis B Adult/Adolescent IM 2003, 2003, 2004    Influenza, Unspecified 2016, 10/23/2018, 10/23/2019    MMR 1993, 1997    Tdap 2017        Family History:  Family History   Problem Relation Age of Onset    Alcohol abuse Mother     Drug abuse Mother          of fentanyl overdose in     Alcohol abuse Father     Drug abuse Father          of opiate overdose in     Heart disease Maternal Grandmother     Heart attack Maternal Grandmother     Alcohol abuse Maternal Grandfather     Alcohol abuse Paternal Grandfather     Colon cancer Neg Hx     Breast cancer Neg Hx     Stroke Neg Hx        Social History:  Social History     Socioeconomic History    Marital status: Single   Tobacco Use    Smoking status: Former     Current packs/day: 0.00     Average packs/day: (0.1 ttl pk-yrs)      "Types: Cigarettes     Start date: 2015     Quit date: 2018     Years since quittin.3    Smokeless tobacco: Never   Vaping Use    Vaping status: Every Day    Substances: Nicotine    Devices: Refillable tank   Substance and Sexual Activity    Alcohol use: Not Currently    Drug use: Not Currently     Types: Marijuana    Sexual activity: Not Currently     Birth control/protection: Abstinence       Objective   /52 (BP Location: Left arm, Patient Position: Sitting, Cuff Size: Adult)   Pulse 72   Resp 20   Ht 152.4 cm (60\")   Wt 99.3 kg (219 lb)   SpO2 97%   BMI 42.77 kg/m²             Physical Exam  Vitals reviewed.   Constitutional:       General: She is not in acute distress.  HENT:      Nose: Nose normal.      Mouth/Throat:      Mouth: Mucous membranes are moist.   Eyes:      Conjunctiva/sclera: Conjunctivae normal.   Cardiovascular:      Rate and Rhythm: Normal rate.      Heart sounds: Normal heart sounds. No murmur heard.  Pulmonary:      Effort: Pulmonary effort is normal. No respiratory distress.      Breath sounds: Normal breath sounds.   Abdominal:      General: Abdomen is flat. Bowel sounds are normal. There is no distension.      Palpations: Abdomen is soft.      Tenderness: There is no abdominal tenderness.   Skin:     General: Skin is warm and dry.   Neurological:      Mental Status: She is alert.   Psychiatric:         Mood and Affect: Mood normal.         Assessment & Plan     1. Anorexia  - follows with Marni Hickman, weekly, working on consistency  - follows with Lucero Larson for counseling, weekly, EMDR, last seen 1 mo ago  - no purging  - mostly restriction issues  - no active palps, presyncope/syncope, leg cramps  - 2 protein shakes per day in the past and then 1 in am on work days, now only prior to gym  - 3-4 days per week gym  - lab holiday     Abnormal EKG  - In review of [facility] EKGs,  TWI V1-V2, : TWI V1-3, today TWI V1-V4. May represent persistent juvenile " pattern but will plan to refer to Cards for further assessment.   - no palps, CP, presyncope or syncope  - may represent persistent juvenile pattern but will refer to Cards for additional assessment in lieu of eating do history and progression of ant leads with TWI as older.   - has not seen cards but still on her to do list     Insomnia  - continue Clonidine no longer using Hydroxyzine for sleep     Depression  - had at one time dx with BAD  - off Lamotrigine for 1yr, took around 5yrs, no differnce  - Fluoxetine has done well, continue  - following with Lucero Larson weekly  ---doing EMDR     Anxiety  - Propranolol used daily, continue     Hyperglycemia  - Hemoglobin A1c 2024 normal     Nicotine Dependence due to Vaping  - she has cut back some     FU in 3mo       Alexandra Irwin MD, FAAP, FACP  Internal Medicine and Pediatrics  [facility]

## 2025-05-27 ENCOUNTER — PATIENT MESSAGE (OUTPATIENT)
Age: 35
End: 2025-05-27
Payer: COMMERCIAL

## 2025-06-25 ENCOUNTER — PATIENT MESSAGE (OUTPATIENT)
Age: 35
End: 2025-06-25
Payer: COMMERCIAL

## 2025-06-25 DIAGNOSIS — Z11.1 SCREENING-PULMONARY TB: Primary | ICD-10-CM

## 2025-07-03 ENCOUNTER — LAB (OUTPATIENT)
Age: 35
End: 2025-07-03
Payer: COMMERCIAL

## 2025-07-03 ENCOUNTER — LAB (OUTPATIENT)
Facility: HOSPITAL | Age: 35
End: 2025-07-03
Payer: COMMERCIAL

## 2025-07-09 ENCOUNTER — LAB (OUTPATIENT)
Age: 35
End: 2025-07-09
Payer: COMMERCIAL

## 2025-07-09 DIAGNOSIS — Z11.1 SCREENING-PULMONARY TB: ICD-10-CM

## 2025-07-09 PROCEDURE — 86480 TB TEST CELL IMMUN MEASURE: CPT | Performed by: INTERNAL MEDICINE

## 2025-07-09 PROCEDURE — 36415 COLL VENOUS BLD VENIPUNCTURE: CPT | Performed by: INTERNAL MEDICINE

## 2025-07-12 ENCOUNTER — RESULTS FOLLOW-UP (OUTPATIENT)
Age: 35
End: 2025-07-12
Payer: COMMERCIAL

## 2025-07-12 LAB
GAMMA INTERFERON BACKGROUND BLD IA-ACNC: 0.02 IU/ML
M TB IFN-G BLD-IMP: NEGATIVE
M TB IFN-G CD4+ BCKGRND COR BLD-ACNC: 0.04 IU/ML
M TB IFN-G CD4+CD8+ BCKGRND COR BLD-ACNC: 0.04 IU/ML
MITOGEN IGNF BCKGRD COR BLD-ACNC: >10 IU/ML
SERVICE CMNT-IMP: NORMAL